# Patient Record
Sex: MALE | Race: WHITE | NOT HISPANIC OR LATINO | Employment: FULL TIME | URBAN - NONMETROPOLITAN AREA
[De-identification: names, ages, dates, MRNs, and addresses within clinical notes are randomized per-mention and may not be internally consistent; named-entity substitution may affect disease eponyms.]

---

## 2017-04-12 ENCOUNTER — OFFICE VISIT (OUTPATIENT)
Dept: INTERNAL MEDICINE | Facility: CLINIC | Age: 27
End: 2017-04-12

## 2017-04-12 VITALS
WEIGHT: 216 LBS | OXYGEN SATURATION: 98 % | TEMPERATURE: 98.5 F | HEART RATE: 74 BPM | HEIGHT: 62 IN | RESPIRATION RATE: 14 BRPM | BODY MASS INDEX: 39.75 KG/M2 | SYSTOLIC BLOOD PRESSURE: 120 MMHG | DIASTOLIC BLOOD PRESSURE: 82 MMHG

## 2017-04-12 DIAGNOSIS — M54.50 LOW BACK PAIN WITHOUT SCIATICA, UNSPECIFIED BACK PAIN LATERALITY, UNSPECIFIED CHRONICITY: ICD-10-CM

## 2017-04-12 DIAGNOSIS — F41.9 ANXIETY: ICD-10-CM

## 2017-04-12 DIAGNOSIS — G47.00 INSOMNIA, UNSPECIFIED TYPE: ICD-10-CM

## 2017-04-12 DIAGNOSIS — R53.83 OTHER FATIGUE: ICD-10-CM

## 2017-04-12 DIAGNOSIS — R10.9 ABDOMINAL PAIN, UNSPECIFIED LOCATION: Primary | ICD-10-CM

## 2017-04-12 LAB
ALBUMIN SERPL-MCNC: 5.2 G/DL (ref 3.5–5)
ALBUMIN/GLOB SERPL: 1.6 G/DL (ref 1–2)
ALP SERPL-CCNC: 66 U/L (ref 38–126)
ALT SERPL-CCNC: 47 U/L (ref 13–69)
AST SERPL-CCNC: 28 U/L (ref 15–46)
BASOPHILS # BLD AUTO: 0.05 10*3/MM3 (ref 0–0.2)
BASOPHILS NFR BLD AUTO: 0.6 % (ref 0–2.5)
BILIRUB SERPL-MCNC: 0.7 MG/DL (ref 0.2–1.3)
BUN SERPL-MCNC: 16 MG/DL (ref 7–20)
BUN/CREAT SERPL: 13.3 (ref 6.3–21.9)
CALCIUM SERPL-MCNC: 10.1 MG/DL (ref 8.4–10.2)
CHLORIDE SERPL-SCNC: 104 MMOL/L (ref 98–107)
CO2 SERPL-SCNC: 28 MMOL/L (ref 26–30)
CREAT SERPL-MCNC: 1.2 MG/DL (ref 0.6–1.3)
CRP SERPL-MCNC: <0.5 MG/DL (ref 0–1)
EOSINOPHIL # BLD AUTO: 0.29 10*3/MM3 (ref 0–0.7)
EOSINOPHIL NFR BLD AUTO: 3.3 % (ref 0–7)
ERYTHROCYTE [DISTWIDTH] IN BLOOD BY AUTOMATED COUNT: 13 % (ref 11.5–14.5)
ERYTHROCYTE [SEDIMENTATION RATE] IN BLOOD BY WESTERGREN METHOD: 1 MM/HR (ref 0–15)
GLOBULIN SER CALC-MCNC: 3.2 GM/DL
GLUCOSE SERPL-MCNC: 95 MG/DL (ref 74–98)
HCT VFR BLD AUTO: 49.3 % (ref 42–52)
HGB BLD-MCNC: 16.9 G/DL (ref 14–18)
IMM GRANULOCYTES # BLD: 0.02 10*3/MM3 (ref 0–0.06)
IMM GRANULOCYTES NFR BLD: 0.2 % (ref 0–0.6)
LYMPHOCYTES # BLD AUTO: 2.45 10*3/MM3 (ref 0.6–3.4)
LYMPHOCYTES NFR BLD AUTO: 28.2 % (ref 10–50)
MCH RBC QN AUTO: 31.2 PG (ref 27–31)
MCHC RBC AUTO-ENTMCNC: 34.3 G/DL (ref 30–37)
MCV RBC AUTO: 91 FL (ref 80–94)
MONOCYTES # BLD AUTO: 0.48 10*3/MM3 (ref 0–0.9)
MONOCYTES NFR BLD AUTO: 5.5 % (ref 0–12)
NEUTROPHILS # BLD AUTO: 5.41 10*3/MM3 (ref 2–6.9)
NEUTROPHILS NFR BLD AUTO: 62.2 % (ref 37–80)
NRBC BLD AUTO-RTO: 0 /100 WBC (ref 0–0)
PLATELET # BLD AUTO: 155 10*3/MM3 (ref 130–400)
POTASSIUM SERPL-SCNC: 4.5 MMOL/L (ref 3.5–5.1)
PROT SERPL-MCNC: 8.4 G/DL (ref 6.3–8.2)
RBC # BLD AUTO: 5.42 10*6/MM3 (ref 4.7–6.1)
SODIUM SERPL-SCNC: 143 MMOL/L (ref 137–145)
WBC # BLD AUTO: 8.7 10*3/MM3 (ref 4.8–10.8)

## 2017-04-12 PROCEDURE — 99204 OFFICE O/P NEW MOD 45 MIN: CPT | Performed by: INTERNAL MEDICINE

## 2017-04-12 RX ORDER — OMEPRAZOLE 40 MG/1
40 CAPSULE, DELAYED RELEASE ORAL DAILY
Qty: 30 CAPSULE | Refills: 2 | Status: SHIPPED | OUTPATIENT
Start: 2017-04-12 | End: 2018-04-09

## 2017-04-12 RX ORDER — BUPROPION HYDROCHLORIDE 150 MG/1
150 TABLET ORAL DAILY
Qty: 30 TABLET | Refills: 2 | Status: SHIPPED | OUTPATIENT
Start: 2017-04-12 | End: 2018-04-09 | Stop reason: ALTCHOICE

## 2017-04-12 NOTE — PROGRESS NOTES
Subjective   Mario White is a 26 y.o. male.     Chief Complaint   Patient presents with   • Establish Care     new pt    • Abdominal Pain     sharp pain.        Abdominal Pain   This is a new problem. The current episode started more than 1 year ago. The onset quality is gradual. The problem occurs intermittently. The problem has been gradually worsening. The pain is located in the epigastric region. The pain is moderate. The quality of the pain is burning. The abdominal pain does not radiate. Associated symptoms include belching and flatus. Pertinent negatives include no anorexia, constipation, diarrhea, dysuria, fever, hematochezia, hematuria, melena, nausea, vomiting or weight loss. Nothing aggravates the pain. The pain is relieved by nothing.    also low back pain achy in nature chronic, otc med helps some, certain position worse. Also anxious without depression. Fatigue and insomnia      Current Outpatient Prescriptions:   •  omeprazole (priLOSEC) 40 MG capsule, Take 1 capsule by mouth Daily., Disp: 30 capsule, Rfl: 2    The following portions of the patient's history were reviewed and updated as appropriate: allergies, current medications, past family history, past medical history, past social history, past surgical history and problem list.    Review of Systems   Constitutional: Negative.  Negative for fever and weight loss.   HENT: Negative.    Eyes: Negative.    Respiratory: Negative.    Cardiovascular: Negative.    Gastrointestinal: Positive for abdominal pain and flatus. Negative for anorexia, constipation, diarrhea, hematochezia, melena, nausea and vomiting.   Endocrine: Negative.    Genitourinary: Negative.  Negative for dysuria and hematuria.   Musculoskeletal: Positive for back pain.   Skin: Negative.    Allergic/Immunologic: Negative.    Neurological: Negative.    Hematological: Negative.    Psychiatric/Behavioral: Negative.    All other systems reviewed and are negative.      Objective   Physical  Exam   Constitutional: He is oriented to person, place, and time. He appears well-developed and well-nourished.   HENT:   Head: Normocephalic and atraumatic.   Right Ear: External ear normal.   Left Ear: External ear normal.   Nose: Nose normal.   Mouth/Throat: Oropharynx is clear and moist.   Eyes: Conjunctivae and EOM are normal. Pupils are equal, round, and reactive to light.   Neck: Normal range of motion. Neck supple. No thyromegaly present.   Cardiovascular: Normal rate, regular rhythm, normal heart sounds and intact distal pulses.    Pulmonary/Chest: Effort normal and breath sounds normal.   Abdominal: Soft. Bowel sounds are normal. He exhibits no distension and no mass. There is tenderness (epigastric and LLQ mild tender). There is no rebound and no guarding. No hernia.   Genitourinary: Penis normal.   Musculoskeletal: Normal range of motion.   Neurological: He is alert and oriented to person, place, and time. He has normal reflexes.   Skin: Skin is warm and dry.   Psychiatric: He has a normal mood and affect. His behavior is normal. Judgment and thought content normal.   Nursing note and vitals reviewed.      All tests have been reviewed.    Assessment/Plan   Diagnoses and all orders for this visit:    Abdominal pain, unspecified location  -     CBC & Differential  -     Comprehensive Metabolic Panel  -     C-reactive Protein  -     Sedimentation Rate  -     H.pylori,IgG / IgA Antibodies; Future    Other fatigue do lab    Anxiety depression, wellbutrin start    Insomnia, unspecified type    Low back pain without sciatica, unspecified back pain laterality, unspecified chronicity,   tylenol start    Pelvic pain left lower groin do lab     Penile sore, no knot    Other orders  -     omeprazole (priLOSEC) 40 MG capsule; Take 1 capsule by mouth Daily.           2 week

## 2017-04-17 ENCOUNTER — RESULTS ENCOUNTER (OUTPATIENT)
Dept: INTERNAL MEDICINE | Facility: CLINIC | Age: 27
End: 2017-04-17

## 2017-04-17 DIAGNOSIS — R10.9 ABDOMINAL PAIN, UNSPECIFIED LOCATION: ICD-10-CM

## 2017-10-21 ENCOUNTER — OFFICE VISIT (OUTPATIENT)
Dept: PRIMARY CARE CLINIC | Age: 27
End: 2017-10-21
Payer: MEDICAID

## 2017-10-21 ENCOUNTER — HOSPITAL ENCOUNTER (OUTPATIENT)
Dept: OTHER | Age: 27
Discharge: HOME | End: 2017-10-21
Attending: NURSE PRACTITIONER | Admitting: NURSE PRACTITIONER

## 2017-10-21 VITALS
SYSTOLIC BLOOD PRESSURE: 122 MMHG | BODY MASS INDEX: 28.26 KG/M2 | RESPIRATION RATE: 20 BRPM | WEIGHT: 220.2 LBS | HEART RATE: 102 BPM | OXYGEN SATURATION: 98 % | DIASTOLIC BLOOD PRESSURE: 76 MMHG | HEIGHT: 74 IN | TEMPERATURE: 98.6 F

## 2017-10-21 DIAGNOSIS — R13.19 ESOPHAGEAL DYSPHAGIA: ICD-10-CM

## 2017-10-21 DIAGNOSIS — K12.2 UVULITIS: ICD-10-CM

## 2017-10-21 DIAGNOSIS — J02.9 SORE THROAT: ICD-10-CM

## 2017-10-21 DIAGNOSIS — R10.13 EPIGASTRIC PAIN: Primary | ICD-10-CM

## 2017-10-21 LAB — S PYO AG THROAT QL: NORMAL

## 2017-10-21 PROCEDURE — 87880 STREP A ASSAY W/OPTIC: CPT | Performed by: NURSE PRACTITIONER

## 2017-10-21 PROCEDURE — 99213 OFFICE O/P EST LOW 20 MIN: CPT | Performed by: NURSE PRACTITIONER

## 2017-10-21 PROCEDURE — 99070 SPECIAL SUPPLIES PHYS/QHP: CPT | Performed by: NURSE PRACTITIONER

## 2017-10-21 RX ORDER — OMEPRAZOLE 40 MG/1
40 CAPSULE, DELAYED RELEASE ORAL DAILY
Qty: 30 CAPSULE | Refills: 3 | Status: SHIPPED | OUTPATIENT
Start: 2017-10-21

## 2017-10-21 RX ORDER — AZITHROMYCIN 250 MG/1
TABLET, FILM COATED ORAL
Qty: 1 PACKET | Refills: 0 | Status: SHIPPED | OUTPATIENT
Start: 2017-10-21

## 2017-10-21 ASSESSMENT — ENCOUNTER SYMPTOMS
WHEEZING: 0
CONSTIPATION: 0
TROUBLE SWALLOWING: 1
ALLERGIC/IMMUNOLOGIC NEGATIVE: 1
ABDOMINAL DISTENTION: 0
COUGH: 0
CHOKING: 1
SHORTNESS OF BREATH: 0
ABDOMINAL PAIN: 1
DIARRHEA: 0
EYES NEGATIVE: 1
CHEST TIGHTNESS: 0
STRIDOR: 0
VOMITING: 0
SORE THROAT: 1
NAUSEA: 0

## 2017-10-21 NOTE — PATIENT INSTRUCTIONS
Smoking can make GERD worse. If you need help quitting, talk to your doctor about stop-smoking programs and medicines. These can increase your chances of quitting for good. · If you have GERD symptoms at night, raise the head of your bed 6 to 8 inches by putting the frame on blocks or placing a foam wedge under the head of your mattress. (Adding extra pillows does not work.)  · Do not wear tight clothing around your middle. · Lose weight if you need to. Losing just 5 to 10 pounds can help. When should you call for help? Call your doctor now or seek immediate medical care if:  · You have new or different belly pain. · Your stools are black and tarlike or have streaks of blood. Watch closely for changes in your health, and be sure to contact your doctor if:  · Your symptoms have not improved after 2 days. · Food seems to catch in your throat or chest.  Where can you learn more? Go to [email or web address]://[email or web address] org and sign in to your "[employer] account. Enter E691 in the [employer] box to learn more about \"Gastroesophageal Reflux Disease (GERD): Care Instructions. \"     If you do not have an account, please click on the \"Sign Up Now\" link. Current as of: August 9, 2016  Content Version: 11.3  © 0413-2970 [employer]. Care instructions adapted under license by [facility] ([facility]). If you have questions about a medical condition or this instruction, always ask your healthcare professional. [place] [address] any warranty or liability for your use of this information. Patient Education        Hiatal Hernia: Care Instructions  Your Care Instructions  A hiatal hernia occurs when part of the stomach bulges into the chest cavity. A hiatal hernia may allow stomach acid and juices to back up into the esophagus (acid reflux). This can cause a feeling of burning, warmth, heat, or pain behind the breastbone.  This feeling may often occur after you eat, soon after you lie down, or when you bend forward, and it may come and go. You also may have a sour taste in your mouth. These symptoms are commonly known as heartburn or reflux. But not all hiatal hernias cause symptoms. Follow-up care is a key part of your treatment and safety. Be sure to make and go to all appointments, and call your doctor if you are having problems. Its also a good idea to know your test results and keep a list of the medicines you take. How can you care for yourself at home? · Take your medicines exactly as prescribed. Call your doctor if you think you are having a problem with your medicine. · Do not take aspirin or other nonsteroidal anti-inflammatory drugs (NSAIDs), such as ibuprofen (Advil, Motrin) or naproxen (Aleve), unless your doctor says it is okay. Ask your doctor what you can take for pain. · Your doctor may recommend over-the-counter medicine. For mild or occasional indigestion, antacids such as Tums, Gaviscon, Maalox, or Mylanta may help. Your doctor also may recommend over-the-counter acid reducers, such as famotidine (Pepcid AC), cimetidine (Tagamet HB), ranitidine (Zantac 75 and Zantac 150), or omeprazole (Prilosec). Read and follow all instructions on the label. If you use these medicines often, talk with your doctor. · Change your eating habits. ¨ Its best to eat several small meals instead of two or three large meals. ¨ After you eat, wait 2 to 3 hours before you lie down. Late-night snacks arent a good idea. ¨ Chocolate, mint, and alcohol can make heartburn worse. They relax the valve between the esophagus and the stomach. ¨ Spicy foods, foods that have a lot of acid (like tomatoes and oranges), and coffee can make heartburn symptoms worse in some people. If your symptoms are worse after you eat a certain food, you may want to stop eating that food to see if your symptoms get better.   · Do not smoke or chew tobacco.  · If you get heartburn at night, raise the head of your bed 6 to 8 inches by putting the frame on blocks or placing a foam wedge under the head of your mattress. (Adding extra pillows does not work.)  · Do not wear tight clothing around your middle. · Lose weight if you need to. Losing just 5 to 10 pounds can help. When should you call for help? Call 911 anytime you think you may need emergency care. For example, call if:  · You have symptoms of a heart attack such as:  ¨ Chest pain or pressure. ¨ Sweating. ¨ Shortness of breath. ¨ Nausea or vomiting. ¨ Pain that spreads from the chest to the neck, jaw, or one or both shoulders or arms. ¨ Dizziness or lightheadedness. ¨ A fast or uneven pulse. After calling 911, chew 1 adult-strength aspirin. Wait for an ambulance. Do not try to drive yourself. · You vomit blood or what looks like coffee grounds. · You pass maroon or very bloody stools. Call your doctor now or seek immediate medical care if:  · You have new or different belly pain. · Your stools are black and tarlike or have streaks of blood. · Food seems to catch in your throat or chest.  Watch closely for changes in your health, and be sure to contact your doctor if:  · Your symptoms get worse. · Your symptoms have not improved after 2 days. Where can you learn more? Go to https://[email or web address]. org and sign in to your [employer] account. Enter N564 in the [facility] box to learn more about \"Hiatal Hernia: Care Instructions. \"     If you do not have an account, please click on the \"Sign Up Now\" link. Current as of: August 9, 2016  Content Version: 11.3  © 9349-5290 [employer]. Care instructions adapted under license by [address] If you have questions about a medical condition or this instruction, always ask your healthcare professional. [place] [address] any warranty or liability for your use of this information.        Patient Education        Sore Throat: Care Instructions  Your Care Instructions    Infection by bacteria or a virus causes most sore throats. Cigarette smoke, dry air, air pollution, allergies, and yelling can also cause a sore throat. Sore throats can be painful and annoying. Fortunately, most sore throats go away on their own. If you have a bacterial infection, your doctor may prescribe antibiotics. Follow-up care is a key part of your treatment and safety. Be sure to make and go to all appointments, and call your doctor if you are having problems. It's also a good idea to know your test results and keep a list of the medicines you take. How can you care for yourself at home? · If your doctor prescribed antibiotics, take them as directed. Do not stop taking them just because you feel better. You need to take the full course of antibiotics. · Gargle with warm salt water once an hour to help reduce swelling and relieve discomfort. Use 1 teaspoon of salt mixed in 1 cup of warm water. · Take an over-the-counter pain medicine, such as acetaminophen (Tylenol), ibuprofen (Advil, Motrin), or naproxen (Aleve). Read and follow all instructions on the label. · Be careful when taking over-the-counter cold or flu medicines and Tylenol at the same time. Many of these medicines have acetaminophen, which is Tylenol. Read the labels to make sure that you are not taking more than the recommended dose. Too much acetaminophen (Tylenol) can be harmful. · Drink plenty of fluids. Fluids may help soothe an irritated throat. Hot fluids, such as tea or soup, may help decrease throat pain. · Use over-the-counter throat lozenges to soothe pain. Regular cough drops or hard candy may also help. These should not be given to young children because of the risk of choking. · Do not smoke or allow others to smoke around you. If you need help quitting, talk to your doctor about stop-smoking programs and medicines. These can increase your chances of quitting for good.   · Use a vaporizer or humidifier to add

## 2017-10-21 NOTE — PROGRESS NOTES
Subjective:      Patient ID: Kevin Wayne is a 33 y.o. male. Patient is a 31-year-old male that presents with a 3-5 day history of sore throat and discomfort with swallowing. Patient states his symptoms initially started with pain in his throat when he swallows but the discomfort has progressed and he has made chest discomfort and epigastric pain, that he describes as stabbing pain when he tries to swallow. He does not recall any episodes where he may have inadvertently eaten something that caused an obstruction. He is able to drink and hold down liquids. He attempted some Pepto-Bismol and was able to keep this down however he continues to have discomfort in his mid chest and epigastric area with swallowing. Patient also states that he has discomfort in his epigastric area when he lays flat on his back or when he lays on his left side. He has no discomfort when he lays on his right side. Recent denies any symptoms of reflux and he denies any symptoms of awakening with choking sensation. Pharyngitis   Associated symptoms include abdominal pain, fatigue and a sore throat. Pertinent negatives include no chest pain, coughing, fever, nausea or vomiting. Review of Systems   Constitutional: Positive for appetite change and fatigue. Negative for fever. HENT: Positive for sore throat and trouble swallowing. Eyes: Negative. Respiratory: Positive for choking. Negative for cough, chest tightness, shortness of breath, wheezing and stridor. Choking sensation with swallowing   Cardiovascular: Negative for chest pain and palpitations. Gastrointestinal: Positive for abdominal pain. Negative for abdominal distention, constipation, diarrhea, nausea and vomiting. Epigastric pain   Endocrine: Negative. Genitourinary: Negative. Musculoskeletal: Negative. Skin: Negative. Allergic/Immunologic: Negative. Neurological: Negative. Hematological: Negative.     Psychiatric/Behavioral: for further evaluation.

## 2017-10-24 LAB — THROAT CULTURE: NORMAL

## 2018-04-09 ENCOUNTER — OFFICE VISIT (OUTPATIENT)
Dept: GASTROENTEROLOGY | Facility: CLINIC | Age: 28
End: 2018-04-09

## 2018-04-09 ENCOUNTER — PREP FOR SURGERY (OUTPATIENT)
Dept: OTHER | Facility: HOSPITAL | Age: 28
End: 2018-04-09

## 2018-04-09 VITALS
RESPIRATION RATE: 16 BRPM | BODY MASS INDEX: 26.05 KG/M2 | TEMPERATURE: 98 F | DIASTOLIC BLOOD PRESSURE: 91 MMHG | HEIGHT: 74 IN | SYSTOLIC BLOOD PRESSURE: 113 MMHG | HEART RATE: 71 BPM | WEIGHT: 203 LBS

## 2018-04-09 DIAGNOSIS — R12 HEARTBURN: Primary | ICD-10-CM

## 2018-04-09 DIAGNOSIS — R12 HEARTBURN: Chronic | ICD-10-CM

## 2018-04-09 DIAGNOSIS — R13.10 DYSPHAGIA, UNSPECIFIED TYPE: ICD-10-CM

## 2018-04-09 DIAGNOSIS — R10.13 EPIGASTRIC PAIN: ICD-10-CM

## 2018-04-09 DIAGNOSIS — K92.1 MELENA: ICD-10-CM

## 2018-04-09 DIAGNOSIS — R13.10 DYSPHAGIA, UNSPECIFIED TYPE: Chronic | ICD-10-CM

## 2018-04-09 DIAGNOSIS — K92.1 MELENA: Chronic | ICD-10-CM

## 2018-04-09 DIAGNOSIS — R10.13 EPIGASTRIC PAIN: Primary | Chronic | ICD-10-CM

## 2018-04-09 PROCEDURE — 99244 OFF/OP CNSLTJ NEW/EST MOD 40: CPT | Performed by: NURSE PRACTITIONER

## 2018-04-09 RX ORDER — SODIUM CHLORIDE 9 MG/ML
70 INJECTION, SOLUTION INTRAVENOUS CONTINUOUS PRN
Status: CANCELLED | OUTPATIENT
Start: 2018-04-09

## 2018-04-09 RX ORDER — PANTOPRAZOLE SODIUM 40 MG/1
TABLET, DELAYED RELEASE ORAL
Qty: 30 TABLET | Refills: 5 | Status: SHIPPED | OUTPATIENT
Start: 2018-04-09 | End: 2020-12-11

## 2018-04-09 NOTE — PROGRESS NOTES
"Chief Complaint   Patient presents with   • Abdominal Pain   • Heartburn     The patient has a history of epigastric pain that started over 5 years ago. The pain is worse in the mornings. The patient has pain daily. The pain has gradually been worsening. The pain is severe and is a sharp, burning pain. Eating makes the pain worse. Nothing improves the pain. The patient has taken Omeprazole in the past, but he ran out, and the Omeprazole did not improve the pain.     There is a long-standing history of heartburn. The patient has worsening of heartburn if he eats certain foods, like garlic. The patient is not taking anything for heartburn at this time. The patient has taken Omeprazole in the past, but it did not improve his heartburn.    The patient has a long-standing history of difficulty swallowing. The patient has an \"episode\" of difficulty swallowing every November and it will last about 1 week. The patient will have difficulty eating and drinking during that week, but after a week, this resolves.     There is a long-standing history of intermittent black stools. The patient may have black stools once per week or so, then at other times it may only occur once per month. The patient has not noticed anything that will cause the black stools and nothing improves them. The patient may take Ibuprofen 1-2 times per week for headaches. The patient denies bright red blood per rectum.     The patient denies constipation or diarrhea. The patient has not had a colonoscopy in the past. There is a family history of colon cancer in the patient's grandfather in his 60's.    Abdominal Pain   This is a chronic problem. Episode onset: over 5 years. The onset quality is sudden. The problem occurs daily. The problem has been gradually worsening. The pain is located in the epigastric region. The pain is severe. The quality of the pain is sharp and burning. The abdominal pain does not radiate. Associated symptoms include melena. " Pertinent negatives include no arthralgias, constipation, diarrhea, dysuria, fever, headaches, hematuria, myalgias, nausea or vomiting. The pain is aggravated by eating. The pain is relieved by nothing. He has tried proton pump inhibitors for the symptoms. The treatment provided no relief.   Heartburn   He complains of abdominal pain and heartburn. He reports no chest pain, no coughing or no nausea. This is a chronic problem. Episode onset: over 5 years. The problem occurs frequently. The problem has been unchanged. The symptoms are aggravated by certain foods. Associated symptoms include fatigue and melena. Pertinent negatives include no anemia. Risk factors include caffeine use and NSAIDs (24 cans per week). He has tried nothing for the symptoms.   Difficulty Swallowing   This is a chronic problem. Episode onset: over 5 years. The problem occurs intermittently. The problem has been unchanged. Associated symptoms include abdominal pain and fatigue. Pertinent negatives include no arthralgias, chest pain, chills, coughing, fever, headaches, joint swelling, myalgias, nausea, rash or vomiting. The symptoms are aggravated by drinking and eating. He has tried nothing for the symptoms.      Review of Systems   Constitutional: Positive for appetite change and fatigue. Negative for chills, fever and unexpected weight change.   HENT: Negative for mouth sores, nosebleeds and trouble swallowing.    Eyes: Negative for discharge and redness.   Respiratory: Negative for apnea, cough and shortness of breath.    Cardiovascular: Negative for chest pain, palpitations and leg swelling.   Gastrointestinal: Positive for abdominal pain, heartburn and melena. Negative for abdominal distention, anal bleeding, blood in stool, constipation, diarrhea, nausea and vomiting.   Endocrine: Negative for cold intolerance, heat intolerance and polydipsia.   Genitourinary: Negative for dysuria, hematuria and urgency.   Musculoskeletal: Negative for  "arthralgias, joint swelling and myalgias.   Skin: Negative for rash.   Allergic/Immunologic: Negative for food allergies and immunocompromised state.   Neurological: Negative for dizziness, seizures, syncope and headaches.   Hematological: Negative for adenopathy. Does not bruise/bleed easily.   Psychiatric/Behavioral: Negative for dysphoric mood. The patient is not nervous/anxious and is not hyperactive.      Patient Active Problem List   Diagnosis   • Abdominal pain   • Fatigue   • Anxiety   • Insomnia   • Low back pain without sciatica   • Epigastric pain   • Heartburn   • Dysphagia   • Melena     Past Medical History:   Diagnosis Date   • Anxiety    • Depression    • GERD (gastroesophageal reflux disease)    • Hernia of scrotum     right side     Past Surgical History:   Procedure Laterality Date   • HERNIA REPAIR  2007     Family History   Problem Relation Age of Onset   • Mental illness Mother    • Diabetes Paternal Uncle    • Arthritis Maternal Grandmother    • Cancer Maternal Grandfather    • Tuberculosis Maternal Grandfather    • Colon cancer Maternal Grandfather 60     Social History   Substance Use Topics   • Smoking status: Current Every Day Smoker     Packs/day: 1.00     Types: Cigarettes   • Smokeless tobacco: Never Used      Comment: STARTED NICOTINE PATCH 4/9/18   • Alcohol use 4.2 - 8.4 oz/week     7 - 14 Shots of liquor per week      Comment: HAS CUT DOWN OVER THE PAST COUPLE WEEKS       Current Outpatient Prescriptions:   •  pantoprazole (PROTONIX) 40 MG EC tablet, 1 po daily in the am 30 minutes before breakfast, Disp: 30 tablet, Rfl: 5    No Known Allergies  /91   Pulse 71   Temp 98 °F (36.7 °C)   Resp 16   Ht 188 cm (74\")   Wt 92.1 kg (203 lb)   BMI 26.06 kg/m²     Physical Exam   Constitutional: He is oriented to person, place, and time. He appears well-developed and well-nourished. No distress.   HENT:   Head: Normocephalic and atraumatic.   Right Ear: Hearing and external ear " normal.   Left Ear: Hearing and external ear normal.   Nose: Nose normal.   Mouth/Throat: Oropharynx is clear and moist and mucous membranes are normal. Mucous membranes are not pale, not dry and not cyanotic. No oral lesions. No oropharyngeal exudate.   Eyes: Conjunctivae and EOM are normal. Right eye exhibits no discharge. Left eye exhibits no discharge.   Neck: Trachea normal. Neck supple. No JVD present. No edema present. No thyroid mass and no thyromegaly present.   Cardiovascular: Normal rate, regular rhythm, S2 normal and normal heart sounds.  Exam reveals no gallop, no S3 and no friction rub.    No murmur heard.  Pulmonary/Chest: Effort normal and breath sounds normal. No respiratory distress. He exhibits no tenderness.   Abdominal: Normal appearance and bowel sounds are normal. He exhibits no distension, no ascites and no mass. There is no splenomegaly or hepatomegaly. There is no tenderness. There is no rigidity, no rebound and no guarding. No hernia.     Vascular Status -  His right foot exhibits no edema. His left foot exhibits no edema.  Lymphadenopathy:     He has no cervical adenopathy.        Left: No supraclavicular adenopathy present.   Neurological: He is alert and oriented to person, place, and time. He has normal strength. No cranial nerve deficit or sensory deficit.   Skin: No rash noted. He is not diaphoretic. No cyanosis. No pallor. Nails show no clubbing.   Psychiatric: He has a normal mood and affect.   Nursing note and vitals reviewed.  Stigmata of chronic liver disease:  None.  Asterixis:  None.    Laboratory Tests:   Upon review of records:     Dated 2/22/2018 glucose 88 potassium 4.3 sodium 139 BUN 18 creatinine 1.17 calcium 10.3 CO2 29 chloride 102 albumin 5.4 alkaline phosphatase 61 ALT 21 AST 23 total bilirubin 0.6 Lipase 12 amylase 28 TSH 1.4 folate 14.2 vitamin B12 515    Assessment:      ICD-10-CM ICD-9-CM   1. Epigastric pain R10.13 789.06   2. Heartburn R12 787.1   3. Dysphagia,  unspecified type R13.10 787.20   4. Melena K92.1 578.1         Discussion:  1.     Plan/  Patient Instructions   1. Antireflux measures: Avoid fried, fatty foods, alcohol, chocolate, coffee, tea,  soft drinks, peppermint and spearmint, spicy foods, tomatoes and tomato based foods, onion based foods, and smoking. Other antireflux measures include weight reduction if overweight, avoiding tight clothing around the abdomen, elevating the head of the bed 6 inches with blocks under the head board, and don't drink or eat before going to bed and avoid lying down immediately after meals.  2. Pantoprazole 40 mg 1 po every am 30 minutes before breakfast.  3. Upper endoscopy-EGD: Description of the procedure, risks, benefits, alternatives and options, including nonoperative options, were discussed with the patient in detail. The patient understands and wishes to proceed.       JEROD Rodriguez

## 2018-04-09 NOTE — PATIENT INSTRUCTIONS
1. Antireflux measures: Avoid fried, fatty foods, alcohol, chocolate, coffee, tea,  soft drinks, peppermint and spearmint, spicy foods, tomatoes and tomato based foods, onion based foods, and smoking. Other antireflux measures include weight reduction if overweight, avoiding tight clothing around the abdomen, elevating the head of the bed 6 inches with blocks under the head board, and don't drink or eat before going to bed and avoid lying down immediately after meals.  2. Pantoprazole 40 mg 1 po every am 30 minutes before breakfast.  3. Upper endoscopy-EGD: Description of the procedure, risks, benefits, alternatives and options, including nonoperative options, were discussed with the patient in detail. The patient understands and wishes to proceed.

## 2018-04-12 PROBLEM — J02.9 SORE THROAT: Status: RESOLVED | Noted: 2017-10-21 | Resolved: 2018-04-12

## 2018-05-23 ENCOUNTER — ANESTHESIA EVENT (OUTPATIENT)
Dept: GASTROENTEROLOGY | Facility: HOSPITAL | Age: 28
End: 2018-05-23

## 2018-05-23 ENCOUNTER — ANESTHESIA (OUTPATIENT)
Dept: GASTROENTEROLOGY | Facility: HOSPITAL | Age: 28
End: 2018-05-23

## 2018-05-23 ENCOUNTER — HOSPITAL ENCOUNTER (OUTPATIENT)
Facility: HOSPITAL | Age: 28
Setting detail: HOSPITAL OUTPATIENT SURGERY
Discharge: HOME | End: 2018-05-23
Attending: INTERNAL MEDICINE | Admitting: INTERNAL MEDICINE

## 2018-05-23 VITALS
DIASTOLIC BLOOD PRESSURE: 70 MMHG | BODY MASS INDEX: 26.95 KG/M2 | TEMPERATURE: 97.9 F | SYSTOLIC BLOOD PRESSURE: 113 MMHG | HEART RATE: 61 BPM | WEIGHT: 210 LBS | HEIGHT: 74 IN | OXYGEN SATURATION: 99 % | RESPIRATION RATE: 18 BRPM

## 2018-05-23 DIAGNOSIS — R10.13 EPIGASTRIC PAIN: ICD-10-CM

## 2018-05-23 DIAGNOSIS — R13.10 DYSPHAGIA, UNSPECIFIED TYPE: ICD-10-CM

## 2018-05-23 DIAGNOSIS — R12 HEARTBURN: ICD-10-CM

## 2018-05-23 PROCEDURE — S0260 H&P FOR SURGERY: HCPCS | Performed by: INTERNAL MEDICINE

## 2018-05-23 PROCEDURE — 43249 ESOPH EGD DILATION <30 MM: CPT | Performed by: INTERNAL MEDICINE

## 2018-05-23 PROCEDURE — 25010000002 PROPOFOL 10 MG/ML EMULSION: Performed by: NURSE ANESTHETIST, CERTIFIED REGISTERED

## 2018-05-23 PROCEDURE — 43239 EGD BIOPSY SINGLE/MULTIPLE: CPT | Performed by: INTERNAL MEDICINE

## 2018-05-23 PROCEDURE — C1726 CATH, BAL DIL, NON-VASCULAR: HCPCS | Performed by: INTERNAL MEDICINE

## 2018-05-23 RX ORDER — LIDOCAINE HYDROCHLORIDE 20 MG/ML
INJECTION, SOLUTION INFILTRATION; PERINEURAL AS NEEDED
Status: DISCONTINUED | OUTPATIENT
Start: 2018-05-23 | End: 2018-05-23

## 2018-05-23 RX ORDER — SODIUM CHLORIDE 9 MG/ML
70 INJECTION, SOLUTION INTRAVENOUS CONTINUOUS PRN
Status: DISCONTINUED | OUTPATIENT
Start: 2018-05-23 | End: 2018-05-23 | Stop reason: HOSPADM

## 2018-05-23 RX ORDER — ONDANSETRON 2 MG/ML
4 INJECTION INTRAMUSCULAR; INTRAVENOUS ONCE AS NEEDED
Status: CANCELLED | OUTPATIENT
Start: 2018-05-23 | End: 2018-05-23

## 2018-05-23 RX ORDER — LIDOCAINE HYDROCHLORIDE 20 MG/ML
INJECTION, SOLUTION INFILTRATION; PERINEURAL AS NEEDED
Status: DISCONTINUED | OUTPATIENT
Start: 2018-05-23 | End: 2018-05-23 | Stop reason: SURG

## 2018-05-23 RX ORDER — SODIUM CHLORIDE, SODIUM LACTATE, POTASSIUM CHLORIDE, CALCIUM CHLORIDE 600; 310; 30; 20 MG/100ML; MG/100ML; MG/100ML; MG/100ML
1000 INJECTION, SOLUTION INTRAVENOUS CONTINUOUS
Status: DISCONTINUED | OUTPATIENT
Start: 2018-05-23 | End: 2018-05-23

## 2018-05-23 RX ORDER — SODIUM CHLORIDE 0.9 % (FLUSH) 0.9 %
3 SYRINGE (ML) INJECTION AS NEEDED
Status: DISCONTINUED | OUTPATIENT
Start: 2018-05-23 | End: 2018-05-23 | Stop reason: HOSPADM

## 2018-05-23 RX ORDER — PROPOFOL 10 MG/ML
VIAL (ML) INTRAVENOUS AS NEEDED
Status: DISCONTINUED | OUTPATIENT
Start: 2018-05-23 | End: 2018-05-23 | Stop reason: SURG

## 2018-05-23 RX ADMIN — PROPOFOL 600 MG: 10 INJECTION, EMULSION INTRAVENOUS at 08:32

## 2018-05-23 RX ADMIN — LIDOCAINE HYDROCHLORIDE 100 MG: 20 INJECTION, SOLUTION INFILTRATION; PERINEURAL at 08:13

## 2018-05-23 RX ADMIN — SODIUM CHLORIDE 70 ML/HR: 9 INJECTION, SOLUTION INTRAVENOUS at 07:02

## 2018-05-23 NOTE — DISCHARGE INSTRUCTIONS
No pushing, pulling, tugging,  heavy lifting, or strenuous activity.  No major decision making, driving, or drinking alcoholic beverages for 24 hours. ( due to the medications you have  received)  Always use good hand hygiene/washing techniques.  NO driving while taking pain medications.    To assist you in voiding:  Drink plenty of fluids  Listen to running water while attempting to void.    If you are unable to urinate and you have an uncomfortable urge to void or it has been   6 hours since you were discharged, return to the Emergency Room      Postprocedure instructions:  Nothing by mouth until fully alert.  Bedrest until fully alert.  Vital signs as routine.    Diet:   1. Nothing by mouth for 30 minutes, then if no chest pains the patient may have Clear liquids diet (No Sodas) for 2 hours.  2. May advance to soft diet in 2 hours if no chest pains, Fever or chills, nausea vomiting or bleeding.  3. The patient may eat in upright position, chew well, take small bites and take medications in upright position.   4. The patient should drink water after 3-4 bites, and liberally with medications.   5. The patient should remain upright for about 10 minutes after eating and taking oral medications.  6. Anti-antireflux measures:  Low fat diet.  Limit Coffee, Chocolate, Fried Foods,   Avoid Sodas,High Fat Foods, Cookies, Excessive Tomato or Onions, Alcohol and Smoking).        Blood Thinner and other medications Directions:  Avoid Aspirin & other NSAIDS.  Tylenol is okay.    Other instructions:  The patient should avoid medications that have a potential to cause pill esophagitis including potassium pills, tetracycline capsules, iron pills, NSAIDs and bisphosphonates.      Follow-up:    DR. JUVENCIO PALACIOS in 4 weeks.Office phone # [phone].

## 2018-05-23 NOTE — OP NOTE
PROCEDURE:  Upper Endoscopy with serial dilation of the distal esophagus using 15-16.5-18 mm CRE balloon to 18 mm and biopsies.    DATE OF PROCEDURE: May 23, 2018.    REFERRING PROVIDER:  JEROD Mehta.     INSTRUMENT:  Olympus GIF H 190 video endoscope     INDICATIONS OF THE PROCEDURE: This is a 28-year-old white male with history of recurrent reflux, epigastric abdominal pain, intermittent dysphagia and history of melena. Currently undergoing upper endoscopy for further evaluation.        BIOPSIES: Second portion of duodenum.  Gastric antrum, body and fundus.  Biopsies were obtained from the mid and distal esophagus-subtle concentric rings. Biopsies were obtained from the proximal esophagus-small localized area of gastric type mucosa which likely represents an inlet patch.     MEDICATIONS:  MAC.     PHOTOGRAPHS:  Photographs were included in the medical records.     CONSENT/PREPROCEDURE EVALUATION:  Risks, benefits, alternatives and options of the procedure including risks of anesthesia/sedation were discussed and informed consent was obtained prior to the procedure. History and physical examination were performed and nothing precluded the test.     REPORT:  The patient was placed in left lateral decubitus position. Once under the influence of IV sedation, the instrument was inserted into the mouth and esophagus was intubated under direct vision without difficulty.     Esophagus:  Upper esophageal sphincter was noted to be somewhat tortuous. A small 3 mm localized gastric type mucosa was seen in the proximal esophagus that likely represents small inlet patch. Biopsies were obtained. Subtle concentric rings were seen within the mid and distal esophagus. Biopsies were obtained. Z line was noted to be around 40  cm.  Erosive distal esophagitis. LA class A. A evidence of old healed ulceration was seen. partial bed was noted within the distal esophagus. A small sliding hiatal hernia less than 3 cm was noted.  No  Bourne's esophagus was seen.     Stomach:  Antrum:  Erythematous gastritis.  Angulus, lesser and greater curves: Normal.  Retroflex examination: Sliding hiatal hernia.  Cardia and fundus:  Normal.     Body of the stomach: Erythematous gastritis.  Good distensibility of the stomach was achieved no giant folds were noted.   Biopsies were obtained from the gastric antrum,  body and fundus.    Pylorus and pyloric channel:  normal.     Duodenum:  Bulb: Normal.  Second portion: normal.  No scalloping was seen in the second portion of duodenum.  Biopsies were obtained from the second portion of duodenum.    Intervention:  Distal esophagus was dilated using 15-16.5-18 mm CRE balloon to 18 mm under vision without difficulty.  Some blood was noted no active bleeding was seen.      The upper GI tract was decompressed and the scope was pulled out of the patient. The patient tolerated the procedure well.     DIAGNOSES:     1. Distal esophageal partial web. Status post serial dilation to 18 mm.  2. Small sliding hiatal hernia less than 3 cm.  3. Erythematous gastritis with evidence of old healed ulceration.  4. Subtle concentric rings within the mid and distal esophagus. Biopsied.  5. Small localized gastric type mucosa within the proximal esophagus which likely represents inlet patch.      RECOMMENDATIONS:  1.  Dietary instructions.  2.  Pantoprazole 40 mg 1 p.o. q.a.m. 1/2 hour before breakfast.  3.  Follow biopsies.  4.  Follow up in office.  5. Avoid NSAIDs.     Thank you very much for letting me participate in the care of this patient. Please do not hesitate to call me if you have any questions.

## 2018-05-23 NOTE — ANESTHESIA POSTPROCEDURE EVALUATION
Patient: Mario White    Procedure Summary     Date:  05/23/18 Room / Location:  [facility] ENDOSCOPY 2 / [facility] ENDOSCOPY    Anesthesia Start:  0806 Anesthesia Stop:  0843    Procedure:  ESOPHAGOGASTRODUODENOSCOPY WITH BALLOON DILITATION (15-16.5-18MM) AND COLD BIOPSIES (N/A Esophagus) Diagnosis:       Esophagitis      Hiatal hernia      Gastritis      Esophageal web      (Heartburn [R12])      (Epigastric pain [R10.13])      (Dysphagia, unspecified type [R13.10])    Surgeon:  Ponce Rodas MD Provider:  Max Henry CRNA    Anesthesia Type:  MAC ASA Status:  2          Anesthesia Type: MAC  Last vitals  BP   107/60 (05/23/18 0840)   Temp   97.9 °F (36.6 °C) (05/23/18 0840)   Pulse   73 (05/23/18 0840)   Resp   16 (05/23/18 0840)     SpO2   98 % (05/23/18 0840)     Post Anesthesia Care and Evaluation    Patient location during evaluation: PHASE II  Patient participation: complete - patient participated  Level of consciousness: awake  Pain score: 1  Pain management: adequate  Airway patency: patent  Anesthetic complications: No anesthetic complications  PONV Status: controlled  Cardiovascular status: acceptable and stable  Respiratory status: acceptable and nasal cannula  Hydration status: acceptable

## 2018-05-23 NOTE — H&P
Chief complaint:  Abdominal Pain    History of present illness: Epigastric Abdominal Pain, Heartburn, Dysphagia, Melena    Past medical history:   Past Medical History:   Diagnosis Date   • Anxiety    • Depression    • GERD (gastroesophageal reflux disease)    • Hernia of scrotum     right side       Surgical history:    Past Surgical History:   Procedure Laterality Date   • HERNIA REPAIR  2007    right inguinal (patient unsure if had mesh)   • MOUTH SURGERY      wisdom teeth extraction x4       Social history:  Social History     Social History   • Marital status:      Spouse name: N/A   • Number of children: N/A   • Years of education: N/A     Occupational History   • Not on file.     Social History Main Topics   • Smoking status: Current Every Day Smoker     Packs/day: 0.50     Types: Cigarettes   • Smokeless tobacco: Never Used      Comment: STARTED NICOTINE PATCH 4/9/18   • Alcohol use 4.2 - 8.4 oz/week     7 - 14 Shots of liquor per week      Comment: HAS CUT DOWN OVER THE PAST COUPLE WEEKS   • Drug use: No      Comment: USED MARIJUANA IN THE PAST   • Sexual activity: Defer     Other Topics Concern   • Not on file     Social History Narrative   • No narrative on file       Allergies:  Patient has no known allergies.  Latex allergy: None  Contrast allergy: None    Medications:  Prescriptions Prior to Admission   Medication Sig Dispense Refill Last Dose   • pantoprazole (PROTONIX) 40 MG EC tablet 1 po daily in the am 30 minutes before breakfast 30 tablet 5 Past Month at Unknown time       Review of systems:   Constitutional: No recent: Fever, Weight loss,   Respiratory: No recent: SOB, Cough,   Cardiovascular: No recent: Chest Pains, congestive heart failure or arrhythmias.   Neurological: No recent: Seizures, CVA, TIA.   Genitourinary: No recent: Renal Failure, UTI.  Endocrine: No recent: Worsening of diabetes or thyroid disease.  Musculoskeletal: No recent: Joint swelling.  Hem. Oncology: No recent:  "Anemia or bleeding.  Psychiatric: No recent: Worsening of depression or anxiety.     VITAL SIGNS:    Blood pressure 133/85, pulse 78, temperature 97.5 °F (36.4 °C), temperature source Temporal Artery , resp. rate 16, height 188 cm (74\"), weight 95.3 kg (210 lb), SpO2 99 %.    PHYSICAL EXAMINATION:   HEENT: Normal.   Lungs: Clear to auscultation.  Heart: No S3, no murmur.    Abdomen: Soft.  BS+ ND, NT  Extremities: No edema.  No cyanosis.  Neuro: Alert X 3. No focal deficit.    Assessment: Epigastric Abdominal Pain, Heartburn, Dysphagia, Melena    Plan:   Upper Endoscopy    Risks/Benefits:  The potential benefits, risk and/or side effects of the procedure and alternatives have been discussed with the patient/authorized representative and questions were answered.    "

## 2018-05-23 NOTE — ANESTHESIA PREPROCEDURE EVALUATION
Anesthesia Evaluation     Patient summary reviewed and Nursing notes reviewed   no history of anesthetic complications:  NPO Solid Status: > 8 hours  NPO Liquid Status: > 8 hours           Airway   Mallampati: II  TM distance: >3 FB  Neck ROM: full  no difficulty expected  Dental - normal exam     Pulmonary - normal exam   (+) a smoker Current Smoked day of surgery,   Cardiovascular - negative cardio ROS and normal exam    Rhythm: regular  Rate: normal        Neuro/Psych  (+) psychiatric history Anxiety and Depression,     GI/Hepatic/Renal/Endo    (+)  GERD poorly controlled,      Musculoskeletal (-) negative ROS    Abdominal    Substance History - negative use     OB/GYN negative ob/gyn ROS         Other - negative ROS                       Anesthesia Plan    ASA 2     MAC   (Pt told that intravenous sedation will be used as the primary anesthetic along with local anesthesia if necessary. Every effort will be made to make sure the patient is comfortable.     The patient was told they may or may not have recall for the procedure. It was further explained that if the MAC was not adequate that a general anesthetic with either an LMA or endotracheal tube would be required.     Will proceed with the plan of care.)  intravenous induction   Anesthetic plan and risks discussed with patient.

## 2018-05-26 LAB
LAB AP CASE REPORT: NORMAL
Lab: NORMAL
PATH REPORT.FINAL DX SPEC: NORMAL

## 2020-12-09 ENCOUNTER — HOSPITAL ENCOUNTER (EMERGENCY)
Facility: HOSPITAL | Age: 30
Discharge: HOME | End: 2020-12-10
Attending: EMERGENCY MEDICINE | Admitting: EMERGENCY MEDICINE

## 2020-12-09 ENCOUNTER — APPOINTMENT (OUTPATIENT)
Dept: GENERAL RADIOLOGY | Facility: HOSPITAL | Age: 30
End: 2020-12-09

## 2020-12-09 DIAGNOSIS — R10.13 EPIGASTRIC ABDOMINAL PAIN: Primary | ICD-10-CM

## 2020-12-09 LAB
ALBUMIN SERPL-MCNC: 5.1 G/DL (ref 3.5–5.2)
ALBUMIN/GLOB SERPL: 2 G/DL
ALP SERPL-CCNC: 64 U/L (ref 39–117)
ALT SERPL W P-5'-P-CCNC: 43 U/L (ref 1–41)
ANION GAP SERPL CALCULATED.3IONS-SCNC: 10.4 MMOL/L (ref 5–15)
AST SERPL-CCNC: 27 U/L (ref 1–40)
BASOPHILS # BLD AUTO: 0.07 10*3/MM3 (ref 0–0.2)
BASOPHILS NFR BLD AUTO: 0.8 % (ref 0–1.5)
BILIRUB SERPL-MCNC: 0.2 MG/DL (ref 0–1.2)
BUN SERPL-MCNC: 14 MG/DL (ref 6–20)
BUN/CREAT SERPL: 12.3 (ref 7–25)
CALCIUM SPEC-SCNC: 10.3 MG/DL (ref 8.6–10.5)
CHLORIDE SERPL-SCNC: 101 MMOL/L (ref 98–107)
CO2 SERPL-SCNC: 31.6 MMOL/L (ref 22–29)
CREAT SERPL-MCNC: 1.14 MG/DL (ref 0.76–1.27)
DEPRECATED RDW RBC AUTO: 41.5 FL (ref 37–54)
EOSINOPHIL # BLD AUTO: 0.42 10*3/MM3 (ref 0–0.4)
EOSINOPHIL NFR BLD AUTO: 4.6 % (ref 0.3–6.2)
ERYTHROCYTE [DISTWIDTH] IN BLOOD BY AUTOMATED COUNT: 12.5 % (ref 12.3–15.4)
GFR SERPL CREATININE-BSD FRML MDRD: 76 ML/MIN/1.73
GLOBULIN UR ELPH-MCNC: 2.6 GM/DL
GLUCOSE SERPL-MCNC: 75 MG/DL (ref 65–99)
HCT VFR BLD AUTO: 47 % (ref 37.5–51)
HGB BLD-MCNC: 16.2 G/DL (ref 13–17.7)
IMM GRANULOCYTES # BLD AUTO: 0.02 10*3/MM3 (ref 0–0.05)
IMM GRANULOCYTES NFR BLD AUTO: 0.2 % (ref 0–0.5)
LIPASE SERPL-CCNC: 17 U/L (ref 13–60)
LYMPHOCYTES # BLD AUTO: 3.04 10*3/MM3 (ref 0.7–3.1)
LYMPHOCYTES NFR BLD AUTO: 33.4 % (ref 19.6–45.3)
MCH RBC QN AUTO: 31.1 PG (ref 26.6–33)
MCHC RBC AUTO-ENTMCNC: 34.5 G/DL (ref 31.5–35.7)
MCV RBC AUTO: 90.2 FL (ref 79–97)
MONOCYTES # BLD AUTO: 0.64 10*3/MM3 (ref 0.1–0.9)
MONOCYTES NFR BLD AUTO: 7 % (ref 5–12)
NEUTROPHILS NFR BLD AUTO: 4.92 10*3/MM3 (ref 1.7–7)
NEUTROPHILS NFR BLD AUTO: 54 % (ref 42.7–76)
NRBC BLD AUTO-RTO: 0 /100 WBC (ref 0–0.2)
PLATELET # BLD AUTO: 169 10*3/MM3 (ref 140–450)
PMV BLD AUTO: 12 FL (ref 6–12)
POTASSIUM SERPL-SCNC: 3.7 MMOL/L (ref 3.5–5.2)
PROT SERPL-MCNC: 7.7 G/DL (ref 6–8.5)
RBC # BLD AUTO: 5.21 10*6/MM3 (ref 4.14–5.8)
SODIUM SERPL-SCNC: 143 MMOL/L (ref 136–145)
WBC # BLD AUTO: 9.11 10*3/MM3 (ref 3.4–10.8)

## 2020-12-09 PROCEDURE — 80053 COMPREHEN METABOLIC PANEL: CPT | Performed by: EMERGENCY MEDICINE

## 2020-12-09 PROCEDURE — 93005 ELECTROCARDIOGRAM TRACING: CPT | Performed by: EMERGENCY MEDICINE

## 2020-12-09 PROCEDURE — 84484 ASSAY OF TROPONIN QUANT: CPT | Performed by: EMERGENCY MEDICINE

## 2020-12-09 PROCEDURE — 96374 THER/PROPH/DIAG INJ IV PUSH: CPT

## 2020-12-09 PROCEDURE — 81003 URINALYSIS AUTO W/O SCOPE: CPT | Performed by: EMERGENCY MEDICINE

## 2020-12-09 PROCEDURE — 71046 X-RAY EXAM CHEST 2 VIEWS: CPT

## 2020-12-09 PROCEDURE — 99284 EMERGENCY DEPT VISIT MOD MDM: CPT

## 2020-12-09 PROCEDURE — 85025 COMPLETE CBC W/AUTO DIFF WBC: CPT | Performed by: EMERGENCY MEDICINE

## 2020-12-09 PROCEDURE — 83690 ASSAY OF LIPASE: CPT | Performed by: EMERGENCY MEDICINE

## 2020-12-09 RX ORDER — SODIUM CHLORIDE 0.9 % (FLUSH) 0.9 %
10 SYRINGE (ML) INJECTION AS NEEDED
Status: DISCONTINUED | OUTPATIENT
Start: 2020-12-09 | End: 2020-12-10 | Stop reason: HOSPADM

## 2020-12-09 RX ORDER — FAMOTIDINE 10 MG/ML
20 INJECTION, SOLUTION INTRAVENOUS ONCE
Status: COMPLETED | OUTPATIENT
Start: 2020-12-09 | End: 2020-12-09

## 2020-12-09 RX ORDER — ESOMEPRAZOLE MAGNESIUM 40 MG/1
40 CAPSULE, DELAYED RELEASE ORAL
COMMUNITY

## 2020-12-09 RX ORDER — MAGNESIUM HYDROXIDE/ALUMINUM HYDROXICE/SIMETHICONE 120; 1200; 1200 MG/30ML; MG/30ML; MG/30ML
SUSPENSION ORAL EVERY 6 HOURS PRN
COMMUNITY

## 2020-12-09 RX ADMIN — FAMOTIDINE 20 MG: 10 INJECTION INTRAVENOUS at 23:59

## 2020-12-10 VITALS
SYSTOLIC BLOOD PRESSURE: 127 MMHG | BODY MASS INDEX: 31.15 KG/M2 | DIASTOLIC BLOOD PRESSURE: 85 MMHG | HEART RATE: 78 BPM | TEMPERATURE: 98.4 F | RESPIRATION RATE: 18 BRPM | WEIGHT: 230 LBS | HEIGHT: 72 IN | OXYGEN SATURATION: 97 %

## 2020-12-10 LAB
BILIRUB UR QL STRIP: NEGATIVE
CLARITY UR: CLEAR
COLOR UR: YELLOW
GLUCOSE UR STRIP-MCNC: NEGATIVE MG/DL
HGB UR QL STRIP.AUTO: NEGATIVE
HOLD SPECIMEN: NORMAL
HOLD SPECIMEN: NORMAL
KETONES UR QL STRIP: ABNORMAL
LEUKOCYTE ESTERASE UR QL STRIP.AUTO: NEGATIVE
NITRITE UR QL STRIP: NEGATIVE
PH UR STRIP.AUTO: 6.5 [PH] (ref 5–8)
PROT UR QL STRIP: NEGATIVE
SP GR UR STRIP: 1.03 (ref 1–1.03)
TROPONIN T SERPL-MCNC: <0.01 NG/ML (ref 0–0.03)
UROBILINOGEN UR QL STRIP: ABNORMAL
WHOLE BLOOD HOLD SPECIMEN: NORMAL
WHOLE BLOOD HOLD SPECIMEN: NORMAL

## 2020-12-10 RX ORDER — SUCRALFATE 1 G/1
1 TABLET ORAL 4 TIMES DAILY
Qty: 56 TABLET | Refills: 0 | Status: SHIPPED | OUTPATIENT
Start: 2020-12-10 | End: 2020-12-24

## 2020-12-10 RX ADMIN — LIDOCAINE HYDROCHLORIDE: 20 SOLUTION ORAL; TOPICAL at 00:32

## 2020-12-10 NOTE — ED PROVIDER NOTES
Subjective   This patient comes in for evaluation of epigastric abdominal pain up into his central chest that he describes as sharp stabbing knifelike pain from the inside out which he has been dealing with intermittently for months or years and also he states earlier today he had some left thumb pain that radiated up into his arm however it has resolved at this time.  He denies shortness of breath.  He denies hematemesis.  He states at times he does note he has some dark stool but has not for several months.  He states his wife stated she thought she saw some bright red blood in his diarrhea which he has had for the past few days.  The patient denies history of hemorrhoids or rectal pain.  He had an EGD performed about a year and a half ago and was told he had 7 ulcers.  He does not take any NSAIDs however he used to drink alcohol heavily.  Now he only drinks once or twice a month and did admit that he did drink alcohol last night.          Review of Systems   Constitutional: Negative.    HENT: Negative.    Eyes: Negative.    Respiratory: Negative.  Negative for shortness of breath.    Cardiovascular: Negative.    Gastrointestinal: Positive for abdominal pain and diarrhea.   Genitourinary: Negative.    Musculoskeletal: Negative.    Skin: Negative.    Allergic/Immunologic: Negative.    Neurological: Negative.    Psychiatric/Behavioral: Negative.    All other systems reviewed and are negative.      Past Medical History:   Diagnosis Date   • Anxiety    • Depression    • GERD (gastroesophageal reflux disease)    • Hernia of scrotum     right side       No Known Allergies    Past Surgical History:   Procedure Laterality Date   • ENDOSCOPY N/A 5/23/2018    Procedure: ESOPHAGOGASTRODUODENOSCOPY WITH BALLOON DILITATION (15-16.5-18MM) AND COLD BIOPSIES;  Surgeon: Ponce Rodas MD;  Location: [facility] ENDOSCOPY;  Service: Gastroenterology   • HERNIA REPAIR  2007    right inguinal (patient unsure if had mesh)   • MOUTH SURGERY       wisdom teeth extraction x4       Family History   Problem Relation Age of Onset   • Mental illness Mother    • Depression Mother    • Diabetes Paternal Uncle    • Arthritis Maternal Grandmother    • Cancer Maternal Grandfather    • Tuberculosis Maternal Grandfather    • Colon cancer Maternal Grandfather 60       Social History     Socioeconomic History   • Marital status:      Spouse name: Not on file   • Number of children: Not on file   • Years of education: Not on file   • Highest education level: Not on file   Tobacco Use   • Smoking status: Current Every Day Smoker     Packs/day: 1.00     Types: Cigarettes   • Smokeless tobacco: Never Used   • Tobacco comment: STARTED NICOTINE PATCH 4/9/18   Substance and Sexual Activity   • Alcohol use: Yes     Alcohol/week: 7.0 - 14.0 standard drinks     Types: 7 - 14 Shots of liquor per week     Comment: HAS CUT DOWN OVER THE PAST COUPLE WEEKS   • Drug use: No     Comment: USED MARIJUANA IN THE PAST   • Sexual activity: Defer           Objective   Physical Exam  Constitutional:       General: He is not in acute distress.     Appearance: He is well-developed and normal weight. He is not ill-appearing, toxic-appearing or diaphoretic.   HENT:      Head: Normocephalic and atraumatic.   Eyes:      Extraocular Movements: Extraocular movements intact.   Cardiovascular:      Rate and Rhythm: Normal rate and regular rhythm.   Pulmonary:      Effort: Pulmonary effort is normal.      Breath sounds: Normal breath sounds.   Neurological:      Mental Status: He is alert.         Procedures           ED Course  ED Course as of Dec 10 0049   Wed Dec 09, 2020   2349 EKG interpreted by me: Sinus rhythm, normal rate, no acute ST/T changes, this is a normal EKG    [MP]   Thu Dec 10, 2020   0001 Lipase: 17 [TM]   0002 Troponin T: <0.010 [TM]   0002 BUN: 14 [TM]   0002 Creatinine: 1.14 [TM]   0002 Total Bilirubin: 0.2 [TM]   0002 AST (SGOT): 27 [TM]   0002 ALT (SGPT)(!): 43 [TM]       ED Course User Index  [MP] Liu Rowe MD  [TM] Shan Gifford PA-C                                           [facility]  Patient has had significant improvement after medication. Findings c/w gastritis/gastric ulcers.  Final diagnoses:   Epigastric abdominal pain            Shan Gifford PA-C  12/10/20 0049

## 2020-12-11 ENCOUNTER — OFFICE VISIT (OUTPATIENT)
Dept: INTERNAL MEDICINE | Facility: CLINIC | Age: 30
End: 2020-12-11

## 2020-12-11 VITALS
SYSTOLIC BLOOD PRESSURE: 120 MMHG | TEMPERATURE: 97.8 F | DIASTOLIC BLOOD PRESSURE: 74 MMHG | OXYGEN SATURATION: 98 % | WEIGHT: 223 LBS | HEIGHT: 72 IN | BODY MASS INDEX: 30.2 KG/M2 | HEART RATE: 88 BPM

## 2020-12-11 DIAGNOSIS — R10.10 UPPER ABDOMINAL PAIN: ICD-10-CM

## 2020-12-11 DIAGNOSIS — K21.9 GASTROESOPHAGEAL REFLUX DISEASE, UNSPECIFIED WHETHER ESOPHAGITIS PRESENT: ICD-10-CM

## 2020-12-11 DIAGNOSIS — K29.70 GASTRITIS, PRESENCE OF BLEEDING UNSPECIFIED, UNSPECIFIED CHRONICITY, UNSPECIFIED GASTRITIS TYPE: ICD-10-CM

## 2020-12-11 DIAGNOSIS — Z76.89 ENCOUNTER TO ESTABLISH CARE: Primary | ICD-10-CM

## 2020-12-11 PROCEDURE — 99214 OFFICE O/P EST MOD 30 MIN: CPT | Performed by: PHYSICIAN ASSISTANT

## 2020-12-11 NOTE — PROGRESS NOTES
Subjective   M[facility] White [facility] a 30 y[facility]    Chief Complaint   Patient presents with   • Establish Care     needs referral to gastro       HPI: He was seen at [facility] ED on 12/9/2020 due to acute epigastric pain.  He was evaluated with CBC, CMP, lipase, troponin, chest x-ray and urinalysis which were all unremarkable.  He was prescribed sucralfate which he feels has helped symptoms considerably since he started using it around 24 hours ago. He has also been taking Nexium OTC daily long-term which was controlling symptoms fairly well with only occasional flareup of mild to moderate epigastric pain until just a few days ago when acute severe epigastric pain began. In the last few days he has been eating very little due to fear that eating will worsen symptoms, although no certain foods make symptoms worse. He has occasional postprandial diarrhea and also reports that he tends to have a little diarrhea once abdominal pain starts. No dysphagia. He tends to have melena a couple of days after an episode of acute abdominal pain. He has had similar previous symptoms and around 2-1/2 years ago he was evaluated by gastroenterology with EGD which showed moderate chronic inactive gastritis.        The following portions of the patient's history were reviewed and updated as appropriate: He  has a past medical history of Anxiety, Depression, GERD (gastroesophageal reflux disease), Hernia of scrotum, and Multiple gastric ulcers.  He does not have any pertinent problems on file.  He  has a past surgical history that includes Hernia repair (2007); Mouth surgery; and Esophagogastroduodenoscopy (N/A, 5/23/2018).  His family history includes Arthritis in his maternal grandmother and another family member; Cancer in his maternal grandfather; Colon cancer (age of onset: 60) in his maternal grandfather; Colon polyps in his maternal grandfather; Depression in his mother; Diabetes in his paternal uncle  and another family member; Heart attack in his father; Mental illness in his mother; Stroke in an other family member; Tuberculosis in his maternal grandfather.  He  reports that he has been smoking cigarettes. He has been smoking about 1.00 pack per day. He has never used smokeless tobacco. He reports current alcohol use of about 7.0 - 14.0 standard drinks of alcohol per week. He reports that he does not use drugs.    Current Outpatient Medications   Medication Sig Dispense Refill   • aluminum-magnesium hydroxide-simethicone (MAALOX/MYLANTA) 200-200-20 MG/5ML suspension Take  by mouth Every 6 (Six) Hours As Needed for Indigestion or Heartburn.     • esomeprazole (nexIUM) 40 MG capsule Take 40 mg by mouth Every Morning Before Breakfast.     • sucralfate (CARAFATE) 1 g tablet Take 1 tablet by mouth 4 (Four) Times a Day for 14 days. 56 tablet 0     No current facility-administered medications for this visit.        Review of Systems  Review of Systems   Constitutional: Negative for activity change, appetite change, chills, fatigue, fever, unexpected weight gain and unexpected weight loss.   HENT: Negative.  Negative for congestion, dental problem, ear pain, mouth sores, nosebleeds, postnasal drip, rhinorrhea, sinus pressure, sneezing, sore throat, swollen glands and voice change.    Eyes: Negative for blurred vision, double vision, pain, discharge, redness, itching and visual disturbance.   Respiratory: Negative for cough, choking, chest tightness, shortness of breath and wheezing.    Cardiovascular: Negative for chest pain, palpitations and leg swelling.   Gastrointestinal: Positive for abdominal pain, blood in stool (melena), diarrhea, GERD and indigestion. Negative for abdominal distention, anal bleeding, constipation, nausea, rectal pain and vomiting.   Endocrine: Negative for cold intolerance, heat intolerance, polydipsia, polyphagia and polyuria.   Genitourinary: Negative.  Negative for decreased libido,  "decreased urine volume, difficulty urinating, discharge, dysuria, frequency, nocturia, erectile dysfunction, penile swelling, scrotal swelling, testicular pain and urgency.   Musculoskeletal: Negative.  Negative for arthralgias, back pain, gait problem, joint swelling, myalgias and neck pain.   Skin: Negative.  Negative for color change, rash and skin lesions.   Allergic/Immunologic: Negative for environmental allergies and immunocompromised state.   Neurological: Negative for dizziness, syncope, facial asymmetry, speech difficulty, weakness, numbness, headache, memory problem and confusion.   Hematological: Negative for adenopathy. Does not bruise/bleed easily.   Psychiatric/Behavioral: Positive for dysphoric mood (stable). Negative for agitation, decreased concentration, hallucinations, self-injury, sleep disturbance, suicidal ideas, depressed mood and stress. The patient is not nervous/anxious (stable).          Objective    /74   Pulse 88   Temp 97.8 °F (36.6 °C)   Ht 182.9 cm (72.01\")   Wt 101 kg (223 lb)   SpO2 98%   BMI 30.24 kg/m²   Physical Exam  Vitals signs and nursing note reviewed.   Constitutional:       General: He is not in acute distress.     Appearance: Normal appearance. He is well-developed. He is obese. He is not ill-appearing, toxic-appearing or diaphoretic.   HENT:      Head: Normocephalic and atraumatic.      Right Ear: External ear normal.      Left Ear: External ear normal.   Eyes:      General: No scleral icterus.     Extraocular Movements: Extraocular movements intact.      Conjunctiva/sclera: Conjunctivae normal.      Pupils: Pupils are equal, round, and reactive to light.   Neck:      Musculoskeletal: Normal range of motion and neck supple. No neck rigidity or muscular tenderness.   Cardiovascular:      Rate and Rhythm: Normal rate and regular rhythm.      Heart sounds: Normal heart sounds. No murmur. No friction rub. No gallop.    Pulmonary:      Effort: Pulmonary effort is " normal. No respiratory distress.      Breath sounds: Normal breath sounds. No wheezing, rhonchi or rales.   Chest:      Chest wall: No tenderness.   Abdominal:      General: Abdomen is flat. Bowel sounds are normal. There is no distension.      Palpations: Abdomen is soft. There is no mass.      Tenderness: There is no abdominal tenderness. There is no right CVA tenderness, left CVA tenderness, guarding or rebound.      Hernia: No hernia is present.   Musculoskeletal: Normal range of motion.         General: No tenderness or deformity.      Right lower leg: No edema.      Left lower leg: No edema.   Lymphadenopathy:      Cervical: No cervical adenopathy.   Skin:     General: Skin is warm and dry.      Capillary Refill: Capillary refill takes less than 2 seconds.      Findings: No lesion or rash.   Neurological:      General: No focal deficit present.      Mental Status: He is alert and oriented to person, place, and time.      Cranial Nerves: No cranial nerve deficit.      Sensory: No sensory deficit.      Motor: No abnormal muscle tone.      Coordination: Coordination normal.      Gait: Gait normal.      Deep Tendon Reflexes: Reflexes normal.   Psychiatric:         Mood and Affect: Mood normal.         Behavior: Behavior normal.         Thought Content: Thought content normal.         Judgment: Judgment normal.           Assessment/Plan      Diagnosis Plan   1. Encounter to establish care     2. Upper abdominal pain  Ambulatory Referral to Gastroenterology   3. Gastritis, presence of bleeding unspecified, unspecified chronicity, unspecified gastritis type  Ambulatory Referral to Gastroenterology   4. Gastroesophageal reflux disease, unspecified whether esophagitis present  Ambulatory Referral to Gastroenterology   Suspect recurrent bleeding peptic ulcers. Continue carafate and nexium until GI consult.  Avoid acidic foods and beverages until further notice.  Smoking cessation and avoidance of alcohol strongly  advised.  Return to ER with any acutely worsened symptoms.      ED record reviewed.      Return in about 6 months (around 6/11/2021) for Annual physical.             EDITH De La Garza  12/11/2020  17:25 EST

## 2021-04-08 ENCOUNTER — HOSPITAL ENCOUNTER (OUTPATIENT)
Dept: ULTRASOUND IMAGING | Facility: HOSPITAL | Age: 31
Discharge: HOME | End: 2021-04-08
Payer: MEDICAID

## 2021-04-08 DIAGNOSIS — R10.9 ABDOMINAL PAIN, UNSPECIFIED ABDOMINAL LOCATION: ICD-10-CM

## 2021-04-08 PROCEDURE — 76700 US EXAM ABDOM COMPLETE: CPT

## 2021-10-06 ENCOUNTER — HOSPITAL ENCOUNTER (OUTPATIENT)
Dept: ULTRASOUND IMAGING | Facility: HOSPITAL | Age: 31
Discharge: HOME | End: 2021-10-06
Payer: MEDICAID

## 2021-10-06 DIAGNOSIS — R10.31 RT GROIN PAIN: ICD-10-CM

## 2021-10-06 PROCEDURE — 76882 US LMTD JT/FCL EVL NVASC XTR: CPT

## (undated) DEVICE — FRCP BIOP COLD ENDOJAW ALLGTR W/NDL 2.8X2300MM BLU

## (undated) DEVICE — CONMED SCOPE SAVER BITE BLOCK, 20X27 MM: Brand: SCOPE SAVER

## (undated) DEVICE — Device

## (undated) DEVICE — ESOPHAGEAL BALLOON DILATATION CATHETER: Brand: CRE FIXED WIRE

## (undated) DEVICE — 2000CC GUARDIAN II: Brand: GUARDIAN

## (undated) DEVICE — DEV INFL ALLIANCE2 SYS

## (undated) DEVICE — ENDOGATOR AUXILIARY WATER JET CONNECTOR: Brand: ENDOGATOR